# Patient Record
Sex: FEMALE | URBAN - METROPOLITAN AREA
[De-identification: names, ages, dates, MRNs, and addresses within clinical notes are randomized per-mention and may not be internally consistent; named-entity substitution may affect disease eponyms.]

---

## 2020-03-13 ENCOUNTER — NEW PATIENT (OUTPATIENT)
Dept: URBAN - METROPOLITAN AREA CLINIC 44 | Facility: CLINIC | Age: 55
End: 2020-03-13
Payer: OTHER GOVERNMENT

## 2020-03-13 DIAGNOSIS — H40.1132 PRIMARY OPEN-ANGLE GLAUCOMA, MODERATE STAGE, BILATERAL: Primary | ICD-10-CM

## 2020-03-13 DIAGNOSIS — H43.393 OTHER VITREOUS OPACITIES, BILATERAL: ICD-10-CM

## 2020-03-13 DIAGNOSIS — H20.9 PRIMARY OPEN-ANGLE GLAUCOMA, MODERATE STAGE, BILATERAL: ICD-10-CM

## 2020-03-13 PROCEDURE — 92133 CPTRZD OPH DX IMG PST SGM ON: CPT | Performed by: OPTOMETRIST

## 2020-03-13 PROCEDURE — 92004 COMPRE OPH EXAM NEW PT 1/>: CPT | Performed by: OPTOMETRIST

## 2020-03-13 PROCEDURE — 92134 CPTRZ OPH DX IMG PST SGM RTA: CPT | Performed by: OPTOMETRIST

## 2020-03-13 PROCEDURE — 76514 ECHO EXAM OF EYE THICKNESS: CPT | Performed by: OPTOMETRIST

## 2020-03-13 RX ORDER — BRIMONIDINE TARTRATE 2 MG/ML
0.2 % SOLUTION/ DROPS OPHTHALMIC
Qty: 1 | Refills: 2 | Status: INACTIVE
Start: 2020-03-13 | End: 2020-04-24

## 2020-03-13 ASSESSMENT — VISUAL ACUITY
OD: 20/40
OS: 20/20

## 2020-03-13 ASSESSMENT — KERATOMETRY
OS: 43.63
OD: 42.88

## 2020-03-13 ASSESSMENT — INTRAOCULAR PRESSURE
OS: 24
OD: 24

## 2020-03-24 ENCOUNTER — NEW PATIENT (OUTPATIENT)
Dept: URBAN - METROPOLITAN AREA CLINIC 44 | Facility: CLINIC | Age: 55
End: 2020-03-24
Payer: OTHER GOVERNMENT

## 2020-03-24 DIAGNOSIS — M06.9 RHEUMATOID ARTHRITIS, UNSPECIFIED: Primary | ICD-10-CM

## 2020-03-24 PROCEDURE — 92004 COMPRE OPH EXAM NEW PT 1/>: CPT | Performed by: OPHTHALMOLOGY

## 2020-03-24 PROCEDURE — 92134 CPTRZ OPH DX IMG PST SGM RTA: CPT | Performed by: OPHTHALMOLOGY

## 2020-03-24 PROCEDURE — 92235 FLUORESCEIN ANGRPH MLTIFRAME: CPT | Performed by: OPHTHALMOLOGY

## 2020-03-24 ASSESSMENT — INTRAOCULAR PRESSURE
OS: 19
OD: 18

## 2020-04-24 ENCOUNTER — FOLLOW UP ESTABLISHED (OUTPATIENT)
Dept: URBAN - METROPOLITAN AREA CLINIC 44 | Facility: CLINIC | Age: 55
End: 2020-04-24
Payer: OTHER GOVERNMENT

## 2020-04-24 DIAGNOSIS — H40.053 OCULAR HYPERTENSION, BILATERAL: Primary | ICD-10-CM

## 2020-04-24 PROCEDURE — 92083 EXTENDED VISUAL FIELD XM: CPT | Performed by: OPTOMETRIST

## 2020-04-24 PROCEDURE — 92012 INTRM OPH EXAM EST PATIENT: CPT | Performed by: OPTOMETRIST

## 2020-04-24 ASSESSMENT — INTRAOCULAR PRESSURE
OS: 20
OD: 19

## 2020-08-25 ENCOUNTER — FOLLOW UP ESTABLISHED (OUTPATIENT)
Dept: URBAN - METROPOLITAN AREA CLINIC 44 | Facility: CLINIC | Age: 55
End: 2020-08-25
Payer: OTHER GOVERNMENT

## 2020-08-25 PROCEDURE — 92012 INTRM OPH EXAM EST PATIENT: CPT | Performed by: OPTOMETRIST

## 2020-08-25 RX ORDER — BRIMONIDINE TARTRATE 2 MG/ML
0.2 % SOLUTION/ DROPS OPHTHALMIC
Qty: 3 | Refills: 3 | Status: INACTIVE
Start: 2020-08-25 | End: 2021-07-19

## 2020-08-25 RX ORDER — BRIMONIDINE TARTRATE 1 MG/ML
0.1 % SOLUTION/ DROPS OPHTHALMIC
Qty: 3 | Refills: 3 | Status: INACTIVE
Start: 2020-08-25 | End: 2020-08-25

## 2020-08-25 ASSESSMENT — INTRAOCULAR PRESSURE
OD: 16
OS: 15

## 2021-09-02 ENCOUNTER — OFFICE VISIT (OUTPATIENT)
Dept: URBAN - METROPOLITAN AREA CLINIC 56 | Facility: CLINIC | Age: 56
End: 2021-09-02
Payer: OTHER GOVERNMENT

## 2021-09-02 DIAGNOSIS — H35.371 PUCKERING OF MACULA, RIGHT EYE: Primary | ICD-10-CM

## 2021-09-02 DIAGNOSIS — H43.813 VITREOUS DEGENERATION, BILATERAL: ICD-10-CM

## 2021-09-02 DIAGNOSIS — H25.12 AGE-RELATED NUCLEAR CATARACT, LEFT EYE: ICD-10-CM

## 2021-09-02 PROCEDURE — 99214 OFFICE O/P EST MOD 30 MIN: CPT | Performed by: STUDENT IN AN ORGANIZED HEALTH CARE EDUCATION/TRAINING PROGRAM

## 2021-09-02 PROCEDURE — 92133 CPTRZD OPH DX IMG PST SGM ON: CPT | Performed by: STUDENT IN AN ORGANIZED HEALTH CARE EDUCATION/TRAINING PROGRAM

## 2021-09-02 PROCEDURE — 92250 FUNDUS PHOTOGRAPHY W/I&R: CPT | Performed by: STUDENT IN AN ORGANIZED HEALTH CARE EDUCATION/TRAINING PROGRAM

## 2021-09-02 PROCEDURE — 92134 CPTRZ OPH DX IMG PST SGM RTA: CPT | Performed by: STUDENT IN AN ORGANIZED HEALTH CARE EDUCATION/TRAINING PROGRAM

## 2021-09-02 RX ORDER — BRIMONIDINE TARTRATE 2 MG/ML
0.2 % SOLUTION/ DROPS OPHTHALMIC
Qty: 5 | Refills: 3 | Status: INACTIVE
Start: 2021-09-02 | End: 2022-02-28

## 2021-09-02 ASSESSMENT — KERATOMETRY
OD: 42.59
OS: 43.84

## 2021-09-02 ASSESSMENT — INTRAOCULAR PRESSURE
OD: 15
OS: 15

## 2021-09-02 ASSESSMENT — VISUAL ACUITY
OS: 20/25
OD: 20/30

## 2021-09-02 NOTE — IMPRESSION/PLAN
Impression: Rheumatoid Arthritis Plan: Treatment Naproxen. 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  Continue care with Rheumatologist.

## 2021-09-02 NOTE — IMPRESSION/PLAN
Impression: Ocular hypertension, bilateral: H40.053. 

(-)FHx 24-2 HVF (04/24/21) OD: few scattered misses OS: few scattered misses RNFL OCT (09/02/21) OD: WNL
   OS: WNL Pachs OD: 
   OS:
Disc Photos (09/02/21) OD: 0.25 no cupping OS: 0.30 cupping? Plan: Educated patient on findings. Continue Brimonidine TID OU. Patient feels as if inferior vision is getting harder to see RTC next available for IOP check and possible 24-2HVF.

## 2021-09-02 NOTE — IMPRESSION/PLAN
Impression: Vitreous degeneration, bilateral: H43.813. Plan: PVD without retinal pathology. No family history, prior peripheral retinal disease, or other risk factors evident. Warning signs of retinal tear and detachment discussed with patient. RTC ASAP if any s/s consistent with RT or RD.

## 2022-04-18 ENCOUNTER — OFFICE VISIT (OUTPATIENT)
Dept: URBAN - METROPOLITAN AREA CLINIC 56 | Facility: CLINIC | Age: 57
End: 2022-04-18
Payer: OTHER GOVERNMENT

## 2022-04-18 DIAGNOSIS — H40.053 OCULAR HYPERTENSION, BILATERAL: Primary | ICD-10-CM

## 2022-04-18 PROCEDURE — 99214 OFFICE O/P EST MOD 30 MIN: CPT | Performed by: STUDENT IN AN ORGANIZED HEALTH CARE EDUCATION/TRAINING PROGRAM

## 2022-04-18 PROCEDURE — 92083 EXTENDED VISUAL FIELD XM: CPT | Performed by: STUDENT IN AN ORGANIZED HEALTH CARE EDUCATION/TRAINING PROGRAM

## 2022-04-18 RX ORDER — DORZOLAMIDE HCL 20 MG/ML
2 % SOLUTION/ DROPS OPHTHALMIC
Qty: 5 | Refills: 0 | Status: ACTIVE
Start: 2022-04-18

## 2022-04-18 ASSESSMENT — INTRAOCULAR PRESSURE
OD: 28
OS: 22

## 2022-04-18 NOTE — IMPRESSION/PLAN
Impression: Ocular hypertension, bilateral: H40.053. 
h/o intolerance with timolol, h/o latanoprost 
unknown Tmax 
(-)FHx 24-2 HVF (04/2022): full OU
RNFL OCT (09/02/21): full OU Pachs OD: 
   OS:
Disc Photos (09/02/21) Plan: elevated IOP OD>OS. HVF overall full OU. Discussed SLT vs gtts, pt interested in SLT OU to improve compliance and possibly control IOP without gtts. Continue brimonidine TID OU Start dorzolamide BID OU

RTC for SLT consult with Dr. Vanesa Goodwin

## 2022-06-23 ENCOUNTER — OFFICE VISIT (OUTPATIENT)
Dept: URBAN - METROPOLITAN AREA CLINIC 56 | Facility: CLINIC | Age: 57
End: 2022-06-23
Payer: OTHER GOVERNMENT

## 2022-06-23 DIAGNOSIS — H10.45 OTHER CHRONIC ALLERGIC CONJUNCTIVITIS: ICD-10-CM

## 2022-06-23 DIAGNOSIS — H25.12 AGE-RELATED NUCLEAR CATARACT, LEFT EYE: ICD-10-CM

## 2022-06-23 DIAGNOSIS — Z96.1 PRESENCE OF INTRAOCULAR LENS: ICD-10-CM

## 2022-06-23 DIAGNOSIS — H40.053 OCULAR HYPERTENSION, BILATERAL: Primary | ICD-10-CM

## 2022-06-23 PROCEDURE — 92020 GONIOSCOPY: CPT | Performed by: OPHTHALMOLOGY

## 2022-06-23 PROCEDURE — 92083 EXTENDED VISUAL FIELD XM: CPT | Performed by: OPHTHALMOLOGY

## 2022-06-23 PROCEDURE — 99214 OFFICE O/P EST MOD 30 MIN: CPT | Performed by: OPHTHALMOLOGY

## 2022-06-23 PROCEDURE — 76514 ECHO EXAM OF EYE THICKNESS: CPT | Performed by: OPHTHALMOLOGY

## 2022-06-23 PROCEDURE — 92133 CPTRZD OPH DX IMG PST SGM ON: CPT | Performed by: OPHTHALMOLOGY

## 2022-06-23 RX ORDER — KETOROLAC TROMETHAMINE 5 MG/ML
0.5 % SOLUTION OPHTHALMIC
Qty: 10 | Refills: 1 | Status: ACTIVE
Start: 2022-06-23

## 2022-06-23 ASSESSMENT — INTRAOCULAR PRESSURE
OS: 22
OD: 26

## 2022-06-23 NOTE — IMPRESSION/PLAN
Impression: Age-related nuclear cataract, left eye: H25.12. Plan: Asymptomatic at this time. Will continue to monitor.

## 2022-06-23 NOTE — IMPRESSION/PLAN
Impression: Ocular hypertension, bilateral: H40.053. Plan: Discussed that IOP is elevated but there is no evidence of vision loss. Discussed diagnosis and Ocular Hypertension Treatment Study. Discussed option of continuing treatment with drops or SLT. Patient elects SLT. Selective Laser Trabeculoplasty risks, benefits, alternatives to laser discussed with patient, including vision loss, inflammation, or elevation of IOP. Discussed it may take anywhere from 6wks to 3mos to see the full effect of the laser. Patient understands that SLT is not a replacement for current drop therapy. All questions answered. Patient understands and desires to proceed. Educational material provided. Continue:  Patient to continue Brimonidine TID OU. Erx'd post op drop of Ketorolac [id number]. See Benjamin Browning to schedule SLT OD then OS. Will need appointment 6wk w Dr Lizy Mendez.

## 2022-06-23 NOTE — IMPRESSION/PLAN
Impression: Other chronic allergic conjunctivitis: H10.45. Plan: Suggest artificial tears 4-6x daily for comfort.

## 2022-09-02 ENCOUNTER — OFFICE VISIT (OUTPATIENT)
Dept: URBAN - METROPOLITAN AREA CLINIC 44 | Facility: CLINIC | Age: 57
End: 2022-09-02
Payer: OTHER GOVERNMENT

## 2022-09-02 DIAGNOSIS — H40.1132 PRIMARY OPEN-ANGLE GLAUCOMA, BILATERAL, MODERATE STAGE: Primary | ICD-10-CM

## 2022-09-02 PROCEDURE — 92020 GONIOSCOPY: CPT | Performed by: OPHTHALMOLOGY

## 2022-09-02 PROCEDURE — 99213 OFFICE O/P EST LOW 20 MIN: CPT | Performed by: OPHTHALMOLOGY

## 2022-09-02 ASSESSMENT — INTRAOCULAR PRESSURE
OS: 21
OD: 26

## 2022-09-02 NOTE — IMPRESSION/PLAN
Impression: Primary open-angle glaucoma, bilateral, moderate stage: L39.6509. Plan: PT IS A GLAUCOMA SUSPECT OU     GONIO : SS OU (09/02/20022) PT WAS REFERRED BY DR Salvador Hollingsworth
LEFT EYE IS THE BETTER SEEING EYE
S/P CE IOL OD 2000 - STEROID INDUCED 30 YEARS AGO WITH LONGSTANDIN H/O IRITIS 
PT REPORTS PATERNAL GRANDMOTHER HAD GLAUCOMA 
PT DENIES SULFA ALLERGY 
PT DENIES LUNG DZ 
TARGET IOP MID TEENS OR LESS 
RECOMMEND : 
1. CONTINUE BRIMONIDINE OU TID 2. PT REPORTS INTOLERANCE TO TIMOPTIC (CAUSES DEPRESSION) 3. PT REPORTS PAST IRITIS - AVOID PGAS 4. PT HAS USED DORZOLAMIDE I THE PAST BUT HAS STOPPED DUE TO BURNING
5. IOP TODAY 09/02/2022 IS SUBOPTIMAL, RECOMMEND SLT LASER OU TO TRY AND LOWER IOP 2-4 MM HG FOR 6-12 MONTHS 6. PT WISHES TO PROCEED WITH SLT LASER OD THEN OS 
7. SCHEDULE SLT LASER OD THEN OS THE PT IS AWARE THAT SLT WILL NOT IMPROVE THE VISION NOR ELIMINATE THE NEED FOR THE BRIMONIDINE.

## 2022-11-02 ENCOUNTER — OFFICE VISIT (OUTPATIENT)
Dept: URBAN - METROPOLITAN AREA CLINIC 44 | Facility: CLINIC | Age: 57
End: 2022-11-02
Payer: OTHER GOVERNMENT

## 2022-11-02 DIAGNOSIS — H40.1132 PRIMARY OPEN-ANGLE GLAUCOMA, BILATERAL, MODERATE STAGE: Primary | ICD-10-CM

## 2022-11-02 PROCEDURE — 99213 OFFICE O/P EST LOW 20 MIN: CPT | Performed by: OPHTHALMOLOGY

## 2022-11-02 RX ORDER — BRIMONIDINE TARTRATE 2 MG/ML
0.2 % SOLUTION/ DROPS OPHTHALMIC
Qty: 10 | Refills: 3 | Status: ACTIVE
Start: 2022-11-02

## 2022-11-02 ASSESSMENT — INTRAOCULAR PRESSURE
OD: 16
OS: 15

## 2022-11-02 NOTE — IMPRESSION/PLAN
Impression: Primary open-angle glaucoma, bilateral, moderate stage: E91.4991. Plan: PT HAS POAG OU   GONIO : SS OU (09/02/20022) PT WAS REFERRED BY DR Christin Mcneill
S/P SLT OD 09/09/22 // S/P SLT OS 09/16/22 THE RIGHT EYE IS PSEUDOPHAKIC AND THE POORER SEEING EYE, THE LEFT EYE IS THE BETTER SEEING EYE (PHAKIC) S/P CE IOL OD 2000 - STEROID INDUCED 30 YEARS AGO WITH LONGSTANDIN H/O IRITIS 
PT REPORTS PATERNAL GRANDMOTHER HAD GLAUCOMA 
PT DENIES SULFA ALLERGY 
PT DENIES LUNG DZ 
TARGET IOP MID TEENS OR LESS 
RECOMMEND : 
1. PT IS DOING WELL S/P SLT OU 2. CONTINUE BRIMONIDINE OU TID 3. PT REPORTS INTOLERANCE TO TIMOPTIC (CAUSES DEPRESSION) 4. PT REPORTS PAST IRITIS - AVOID PGAS 5. PT HAS USED DORZOLAMIDE I THE PAST BUT HAS STOPPED DUE TO BURNING 6.  F/U 6 MONTHS WITH  [facility]

## 2023-12-19 ENCOUNTER — OFFICE VISIT (OUTPATIENT)
Dept: URBAN - METROPOLITAN AREA CLINIC 56 | Facility: LOCATION | Age: 58
End: 2023-12-19
Payer: OTHER GOVERNMENT

## 2023-12-19 DIAGNOSIS — H25.12 AGE-RELATED NUCLEAR CATARACT, LEFT EYE: Primary | ICD-10-CM

## 2023-12-19 DIAGNOSIS — H43.393 OTHER VITREOUS OPACITIES, BILATERAL: ICD-10-CM

## 2023-12-19 DIAGNOSIS — H35.371 PUCKERING OF MACULA, RIGHT EYE: ICD-10-CM

## 2023-12-19 DIAGNOSIS — Z96.1 PRESENCE OF INTRAOCULAR LENS: ICD-10-CM

## 2023-12-19 PROCEDURE — 92133 CPTRZD OPH DX IMG PST SGM ON: CPT | Performed by: STUDENT IN AN ORGANIZED HEALTH CARE EDUCATION/TRAINING PROGRAM

## 2023-12-19 PROCEDURE — 99214 OFFICE O/P EST MOD 30 MIN: CPT | Performed by: STUDENT IN AN ORGANIZED HEALTH CARE EDUCATION/TRAINING PROGRAM

## 2023-12-19 PROCEDURE — 92134 CPTRZ OPH DX IMG PST SGM RTA: CPT | Performed by: STUDENT IN AN ORGANIZED HEALTH CARE EDUCATION/TRAINING PROGRAM

## 2023-12-19 RX ORDER — BRIMONIDINE TARTRATE, TIMOLOL MALEATE 2; 5 MG/ML; MG/ML
SOLUTION/ DROPS OPHTHALMIC
Qty: 5 | Refills: 1 | Status: ACTIVE
Start: 2023-12-19

## 2023-12-19 ASSESSMENT — VISUAL ACUITY
OD: 20/40
OS: 20/20

## 2023-12-19 ASSESSMENT — INTRAOCULAR PRESSURE
OD: 24
OS: 21
OD: 18
OS: 22

## 2023-12-19 ASSESSMENT — KERATOMETRY
OD: 42.65
OS: 43.98

## 2024-01-19 ENCOUNTER — OFFICE VISIT (OUTPATIENT)
Dept: URBAN - METROPOLITAN AREA CLINIC 56 | Facility: LOCATION | Age: 59
End: 2024-01-19
Payer: OTHER GOVERNMENT

## 2024-01-19 DIAGNOSIS — H40.1132 PRIMARY OPEN-ANGLE GLAUCOMA, BILATERAL, MODERATE STAGE: Primary | ICD-10-CM

## 2024-01-19 PROCEDURE — 99214 OFFICE O/P EST MOD 30 MIN: CPT | Performed by: STUDENT IN AN ORGANIZED HEALTH CARE EDUCATION/TRAINING PROGRAM

## 2024-01-19 RX ORDER — BRINZOLAMIDE/BRIMONIDINE TARTRATE 10; 2 MG/ML; MG/ML
SUSPENSION/ DROPS OPHTHALMIC
Qty: 5 | Refills: 3 | Status: ACTIVE
Start: 2024-01-19

## 2024-01-19 ASSESSMENT — INTRAOCULAR PRESSURE
OD: 19
OS: 18

## 2024-02-27 ENCOUNTER — OFFICE VISIT (OUTPATIENT)
Dept: URBAN - METROPOLITAN AREA CLINIC 56 | Facility: LOCATION | Age: 59
End: 2024-02-27
Payer: OTHER GOVERNMENT

## 2024-02-27 DIAGNOSIS — H40.1131 PRIMARY OPEN-ANGLE GLAUCOMA, BILATERAL, MILD STAGE: Primary | ICD-10-CM

## 2024-02-27 PROCEDURE — 99213 OFFICE O/P EST LOW 20 MIN: CPT | Performed by: STUDENT IN AN ORGANIZED HEALTH CARE EDUCATION/TRAINING PROGRAM

## 2024-02-27 RX ORDER — BRINZOLAMIDE/BRIMONIDINE TARTRATE 10; 2 MG/ML; MG/ML
SUSPENSION/ DROPS OPHTHALMIC
Qty: 5 | Refills: 3 | Status: ACTIVE
Start: 2024-02-27

## 2024-02-27 ASSESSMENT — INTRAOCULAR PRESSURE
OD: 19
OS: 18

## 2024-08-27 ENCOUNTER — OFFICE VISIT (OUTPATIENT)
Dept: URBAN - METROPOLITAN AREA CLINIC 56 | Facility: LOCATION | Age: 59
End: 2024-08-27
Payer: OTHER GOVERNMENT

## 2024-08-27 DIAGNOSIS — H40.1131 PRIMARY OPEN-ANGLE GLAUCOMA, BILATERAL, MILD STAGE: Primary | ICD-10-CM

## 2024-08-27 PROCEDURE — 99213 OFFICE O/P EST LOW 20 MIN: CPT | Performed by: STUDENT IN AN ORGANIZED HEALTH CARE EDUCATION/TRAINING PROGRAM

## 2024-08-27 PROCEDURE — 92083 EXTENDED VISUAL FIELD XM: CPT | Performed by: STUDENT IN AN ORGANIZED HEALTH CARE EDUCATION/TRAINING PROGRAM

## 2024-08-27 RX ORDER — BRINZOLAMIDE/BRIMONIDINE TARTRATE 10; 2 MG/ML; MG/ML
SUSPENSION/ DROPS OPHTHALMIC
Qty: 5 | Refills: 3 | Status: ACTIVE
Start: 2024-08-27

## 2024-08-27 ASSESSMENT — INTRAOCULAR PRESSURE
OS: 20
OD: 18

## 2025-02-25 ENCOUNTER — OFFICE VISIT (OUTPATIENT)
Dept: URBAN - METROPOLITAN AREA CLINIC 56 | Facility: LOCATION | Age: 60
End: 2025-02-25
Payer: COMMERCIAL

## 2025-02-25 DIAGNOSIS — Z96.1 PRESENCE OF INTRAOCULAR LENS: ICD-10-CM

## 2025-02-25 DIAGNOSIS — H52.4 PRESBYOPIA: ICD-10-CM

## 2025-02-25 DIAGNOSIS — H43.393 OTHER VITREOUS OPACITIES, BILATERAL: ICD-10-CM

## 2025-02-25 DIAGNOSIS — H35.371 PUCKERING OF MACULA, RIGHT EYE: ICD-10-CM

## 2025-02-25 DIAGNOSIS — H25.12 AGE-RELATED NUCLEAR CATARACT, LEFT EYE: ICD-10-CM

## 2025-02-25 DIAGNOSIS — H40.1131 PRIMARY OPEN-ANGLE GLAUCOMA, BILATERAL, MILD STAGE: Primary | ICD-10-CM

## 2025-02-25 PROCEDURE — 92133 CPTRZD OPH DX IMG PST SGM ON: CPT | Performed by: STUDENT IN AN ORGANIZED HEALTH CARE EDUCATION/TRAINING PROGRAM

## 2025-02-25 PROCEDURE — 92014 COMPRE OPH EXAM EST PT 1/>: CPT | Performed by: STUDENT IN AN ORGANIZED HEALTH CARE EDUCATION/TRAINING PROGRAM

## 2025-02-25 ASSESSMENT — INTRAOCULAR PRESSURE
OD: 22
OS: 18

## 2025-02-25 ASSESSMENT — KERATOMETRY
OD: 42.80
OS: 43.87

## 2025-02-25 ASSESSMENT — VISUAL ACUITY
OD: 20/30
OS: 20/20